# Patient Record
Sex: MALE | Race: WHITE
[De-identification: names, ages, dates, MRNs, and addresses within clinical notes are randomized per-mention and may not be internally consistent; named-entity substitution may affect disease eponyms.]

---

## 2020-01-30 ENCOUNTER — HOSPITAL ENCOUNTER (OUTPATIENT)
Dept: HOSPITAL 92 - SDC | Age: 65
Discharge: HOME | End: 2020-01-30
Attending: INTERNAL MEDICINE
Payer: MEDICARE

## 2020-01-30 VITALS — BODY MASS INDEX: 17.6 KG/M2

## 2020-01-30 DIAGNOSIS — F41.9: ICD-10-CM

## 2020-01-30 DIAGNOSIS — D12.2: ICD-10-CM

## 2020-01-30 DIAGNOSIS — D12.3: ICD-10-CM

## 2020-01-30 DIAGNOSIS — Z79.51: ICD-10-CM

## 2020-01-30 DIAGNOSIS — K76.89: ICD-10-CM

## 2020-01-30 DIAGNOSIS — Z87.891: ICD-10-CM

## 2020-01-30 DIAGNOSIS — J44.9: ICD-10-CM

## 2020-01-30 DIAGNOSIS — Z12.11: Primary | ICD-10-CM

## 2020-01-30 DIAGNOSIS — Z79.899: ICD-10-CM

## 2020-01-30 DIAGNOSIS — F32.9: ICD-10-CM

## 2020-01-30 DIAGNOSIS — Z88.5: ICD-10-CM

## 2020-01-30 PROCEDURE — 36415 COLL VENOUS BLD VENIPUNCTURE: CPT

## 2020-01-30 PROCEDURE — 82607 VITAMIN B-12: CPT

## 2020-01-30 PROCEDURE — 0DBK8ZX EXCISION OF ASCENDING COLON, VIA NATURAL OR ARTIFICIAL OPENING ENDOSCOPIC, DIAGNOSTIC: ICD-10-PCS | Performed by: INTERNAL MEDICINE

## 2020-01-30 PROCEDURE — 0DBL8ZX EXCISION OF TRANSVERSE COLON, VIA NATURAL OR ARTIFICIAL OPENING ENDOSCOPIC, DIAGNOSTIC: ICD-10-PCS | Performed by: INTERNAL MEDICINE

## 2020-01-30 PROCEDURE — 88305 TISSUE EXAM BY PATHOLOGIST: CPT

## 2020-01-30 PROCEDURE — 82746 ASSAY OF FOLIC ACID SERUM: CPT

## 2020-01-30 NOTE — OP
DATE OF PROCEDURE:  01/30/2020



PROCEDURES PERFORMED:  

1. Colonoscopy.

2. Polypectomy.



PREPROCEDURE DIAGNOSIS:  Colorectal cancer screening.



POSTPROCEDURE DIAGNOSIS:  Four polyps removed from the colon, submitted to Pathology.



RECOMMENDATIONS:  Await histopathology.  Repeat colonoscopy in 3 years.



ANESTHESIA:  TIVA.



PROCEDURE IN DETAIL:  After the patient was informed of the risk, benefits, and

possible complications of endoscopy including perforation, bleeding, reaction to

medication, and aspiration, informed consent was obtained.  The patient was brought

to endoscopy suite, where he was sedated in gradual fashion.  Once he was

comfortable, a rectal examination was performed.  The endoscope was advanced from

the anal canal through the colon to the cecum, which was identified by ileocecal

valve and appendiceal orifice.  The prep was very good.  The scope was then slowly

removed.  There were two polyps in the ascending colon about 0.7 cm in size,

sessile, removed by hot snare polypectomy.  There were two polyps in the transverse

colon, one about 7 mm and the other 5 mm, both removed by snare polypectomy, one

larger, hot, the small is cold.  There was good hemostasis.  There was no other

polyp seen.  Retroflexed views in the rectum were normal.  Scope was removed.  The

patient then was brought to recovery room in stable condition. 







Job ID:  [id number]